# Patient Record
(demographics unavailable — no encounter records)

---

## 2024-10-17 NOTE — HISTORY OF PRESENT ILLNESS
[FreeTextEntry1] : HTN [de-identified] : vaccine- rec  COVID , flu vac.   got both shingrix vac- 9/2023, 12/2023 exercise- walking 20 min daily reviewed 4/24/24 labs-nl CBC, vit D , ESR reviewed 6/4/24 US breast, mammo-nl daytime sleepiness/ fatigue- rec Sleep study-has disrupted sleep due to handicap daughter. hypothyroid- compliant w meds.   c/o 1 yr of prominence R clavicle- reviewed 10/3/23 Xray clavicle- AC jt arthrosis.  reviewed 10/23/23 Ortho notes- likely benign ant subluxation of sternoclavicular jt b/l  fatigue-chronic- 1 yr.  had stress and ECHO last yr.  states has to take daytime naps.  doesn't know if she snores.  Sleeps alone.  sleeps 6-7 hrs HTN-      compliant with med.     - reviewed 8/27/24 ECHO- mild - mod AR, LA - mild dilated.  EF 55-60%, nl aorta IGT- diet reviewed.  states she has improved her diet chol- eating cheese daily knee pain-better.  also using Marijuana cream , meloxicam- 1 / wk,  ultram- tx by rheum, Neuro.  pt did MRI.  reviewed 8/4/22 Ortho -lateral menciscus tear- GERD- On pantop 40 mg- pt didn't tolerate lower dose.     was able to reviewed EGD 4/25/22 -- nl-  symptoms better w PPI.   denies alcohol.   1 cup coffee/ day.  taking meloxicam R side shoulder, upper back pain- 4-5 mo- daily-intermittent. worse on carrying objt or lifting the R shoulder.  pain is better on rest rarely takes meloxicam. states this was evaluated by Rheumatology C spine radiculopathy- reviewed 6/7/23 Ortho notes.  reviewed 2/28/24 colonoscopy - diverticula, hemorrhoid- Rpt 5 yr reviewed 3/3/24 DEXA-nl

## 2024-10-17 NOTE — PHYSICAL EXAM
[No Lymphadenopathy] : no lymphadenopathy [TextEntry] : Constitutional: no acute distress, well nourished, well developed and well-appearing. Pulmonary: no respiratory distress, lungs were clear to auscultation bilaterally, no accessory muscle use. Cardiac: normal rate, with a regular rhythm, normal S1 and S2 and no murmur heard.  Vascular: there was no peripheral edema. Abdomen: abdomen soft, non-tender, non-distended, no abdominal mass palpated and normal bowel sounds. Psychiatric: the affect was normal and insight and judgement were intact [de-identified] : oropharynx- mild erythema

## 2024-10-17 NOTE — PLAN
[FreeTextEntry1] : HTN- use Tylenol.  avoid meloxicam and NSAID.  low salt diet.  ck BP at the  pharmacy chol- low fat cheese flu vac given

## 2024-11-04 NOTE — ASSESSMENT
[FreeTextEntry1] : This is a 60-year-old female with a significant past medical history of snoring and daytime sleepiness who comes in for a sleep evaluation/establish care.  Assessment: 1. Excessive daytime sleepiness: The patient reports excessive daytime sleepiness and fatigue, which may be indicative of a sleep disorder. 2. Snoring and potential sleep apnea: The patient's heavy snoring and episodes of waking up with a sensation of blocked nasal passages suggest possible sleep apnea. 3. Thyroid condition: The patient has a history of thyroid issues, which may contribute to her symptoms.  Plan: - Refer the patient for a sleep study to monitor breathing, oxygen levels, and sleep patterns to diagnose potential sleep apnea. - Provide instructions for the sleep study, including equipment pickup at [address] and subsequent return after one night of use. - Schedule a follow-up appointment one or two weeks after the sleep study to review results and discuss further management.  Follow up: The patient will follow up in one or two weeks after the sleep study to review results and determine the next steps in management.

## 2024-11-04 NOTE — PHYSICAL EXAM
[No Acute Distress] : no acute distress [No Deformities] : no deformities [Low Lying Soft Palate] : low lying soft palate [III] : Mallampati Class: III [1+] : Right Tonsil: 1+ [Normal Appearance] : normal appearance [No Neck Mass] : no neck mass [Normal Rate/Rhythm] : normal rate/rhythm [Normal S1, S2] : normal s1, s2 [No Murmurs] : no murmurs [No Resp Distress] : no resp distress [Clear to Auscultation Bilaterally] : clear to auscultation bilaterally [No Clubbing] : no clubbing [No Cyanosis] : no cyanosis [No Edema] : no edema [No Focal Deficits] : no focal deficits [Oriented x3] : oriented x3 [Normal Affect] : normal affect

## 2024-11-04 NOTE — HISTORY OF PRESENT ILLNESS
[Awakes Unrefreshed] : awakes unrefreshed [Awakes with Dry Mouth] : awakes with dry mouth [Daytime Somnolence] : daytime somnolence [Nonrestorative Sleep] : nonrestorative sleep [Snoring] : snoring [Witnessed Apneas] : witnessed apneas [Hypersomnolence] : hypersomnolence [Never] : never [TextBox_4] : This is a 60-year-old female with a significant past medical history of snoring and daytime sleepiness who comes in for a sleep evaluation/establish care.  The patient presents with persistent excessive daytime sleepiness, reporting that if she sits down and gets bored, she falls asleep, which occurs almost daily. This has been ongoing for over a year. She also experiences significant fatigue, preferring to sleep rather than eat after work, often taking naps lasting two to three hours. The patient has a history of thyroid issues. She typically goes to bed around 8-9 PM and wakes up at 4 AM, rarely waking up during the night, sometimes not even hearing the alarm. However, she does take naps during the day, usually twice, lasting two to three hours each. The patient snores heavily, as reported by family members, and occasionally wakes up due to a sensation of blocked nasal passages, suspecting sinusitis but has not been diagnosed with it. She uses nasal strips or takes Clarity to alleviate the nasal blockage, which seems to help her sleep better. The patient denies waking up with headaches but does wake up with a dry mouth. She has not fallen asleep while working, but she does feel sleepy while driving, especially in traffic. No leg swelling is reported. [Awakes with Headache] : does not awaken with headache [Frequent Nocturnal Awakening] : denies frequent nocturnal awakening [Recent  Weight Gain] : no recent weight gain [Unintentional Sleep while Active] : no unintentional sleep while active [DIS] : does not have difficulty initiating sleep [DMS] : does not have difficulty maintaining sleep [Cataplexy] : no cataplexy [Sleep Paralysis] : no sleep paralysis [Hypnagogic Hallucinations] : no hypnagogic hallucinations [Hypnopompic Hallucinations] : no hypnopompic hallucinations [Lower Extremity Discomfort] : does not have lower extremity discomfort [Irresistible urge to move legs] : does not have irresistible urge to move legs [LE discomfort relieved by movement] : denies lower extremity discomfort relieved by movement [Late day/ Evening symptoms] : does not have late day/ evening symptoms [Sleep Disturbances due to LE symptoms] : denies sleep disturbances due to lower extremity symptoms [TextBox_77] : 8-9pm [TextBox_79] : 4am [TextBox_83] : 0-2 [TextBox_89] : 0 [TextBox_93] : 2-3 hour naps [ESS] : 15

## 2024-12-02 NOTE — PLAN
[FreeTextEntry1] : pain- rec ice, back brace, diclofenac cream.  if worse, pt will f/u earlier HTN- avoid NSAIDs

## 2024-12-02 NOTE — PHYSICAL EXAM
[TextEntry] : Constitutional: no acute distress, well nourished, well developed and well-appearing. Pulmonary: no respiratory distress, lungs were clear to auscultation bilaterally, no accessory muscle use. Cardiac: normal rate, with a regular rhythm, normal S1 and S2 and no murmur heard.  Vascular: there was no peripheral edema. Abdomen: abdomen soft, non-tender, non-distended, no abdominal mass palpated and normal bowel sounds. Psychiatric: the affect was normal and insight and judgement were intact [de-identified] : limited ROM due to pain. R lower rib tenderness.  lower L spine tenderness.  [de-identified] : good ROM of R hip- mild discomfort

## 2024-12-02 NOTE — HISTORY OF PRESENT ILLNESS
[FreeTextEntry8] : CC: fall pt fell yest when going down the stairs.  Missed 1 step and landed on her R back.    no trauma to the neck. no LOC.    today has pain from R mid back to R posterior thigh. .  able to walk.   no weakness of Lower ext yest took tramadol.  [de-identified] : daytime sleepiness/ fatigue- rec Sleep study-has disrupted sleep due to handicap daughter.- reviewed 11/4/24 Pulm notes- rec Sleep Study hypothyroid- compliant w meds.   c/o 1 yr of prominence R clavicle- reviewed 10/3/23 Xray clavicle- AC jt arthrosis.  reviewed 10/23/23 Ortho notes- likely benign ant subluxation of sternoclavicular jt b/l  fatigue-chronic- 1 yr.  had stress and ECHO last yr.  states has to take daytime naps.  doesn't know if she snores.  Sleeps alone.  sleeps 6-7 hrs HTN-      compliant with med.     - reviewed 8/27/24 ECHO- mild - mod AR, LA - mild dilated.  EF 55-60%, nl aorta IGT- diet reviewed.  states she has improved her diet chol- eating cheese daily knee pain-better.  also using Marijuana cream , meloxicam- 1 / wk,  ultram- tx by rheum, Neuro.  pt did MRI.  reviewed 8/4/22 Ortho -lateral menciscus tear- GERD- On pantop 40 mg- pt didn't tolerate lower dose.     was able to reviewed EGD 4/25/22 -- nl-  symptoms better w PPI.   denies alcohol.   1 cup coffee/ day.  taking meloxicam R side shoulder, upper back pain- 4-5 mo- daily-intermittent. worse on carrying objt or lifting the R shoulder.  pain is better on rest rarely takes meloxicam. states this was evaluated by Rheumatology C spine radiculopathy- reviewed 6/7/23 Ortho notes.  reviewed 11/29/24 labs , A1c 5.7

## 2024-12-09 NOTE — PHYSICAL EXAM
[TextEntry] : Constitutional: no acute distress, well nourished, well developed and well-appearing. Pulmonary: no respiratory distress, lungs were clear to auscultation bilaterally, no accessory muscle use. Cardiac: normal rate, with a regular rhythm, normal S1 and S2 and no murmur heard.  Vascular: there was no peripheral edema. Abdomen: abdomen soft, non-tender, non-distended, no abdominal mass palpated and normal bowel sounds. Psychiatric: the affect was normal and insight and judgement were intact [de-identified] : limited ROM due to pain.

## 2024-12-09 NOTE — HISTORY OF PRESENT ILLNESS
[FreeTextEntry1] : back pain [de-identified] :  has pain from R mid back to R posterior thigh pt had fallen down the steps. .  able to walk.   no weakness of Lower ext after fall. f/u from 12/2/24.   no hematuria   pain is 25 % better.  xray is not read. ultram and meloxicam help w the pain.  pain better w back brace.  daytime sleepiness/ fatigue- rec Sleep study-has disrupted sleep due to handicap daughter.- reviewed 11/4/24 Pulm notes- rec Sleep Study. hypothyroid- compliant w meds.   c/o 1 yr of prominence R clavicle- reviewed 10/3/23 Xray clavicle- AC jt arthrosis.  reviewed 10/23/23 Ortho notes- likely benign ant subluxation of sternoclavicular jt b/l  fatigue-chronic- 1 yr.  had stress and ECHO last yr.  states has to take daytime naps.  doesn't know if she snores.  Sleeps alone.  sleeps 6-7 hrs HTN-      compliant with med.     - reviewed 8/27/24 ECHO- mild - mod AR, LA - mild dilated.  EF 55-60%, nl aorta IGT- diet reviewed.  states she has improved her diet chol- eating cheese daily knee pain-better.   meloxicam- 1 / wk,  ultram- tx by rheum, Neuro.  pt did MRI.  reviewed 8/4/22 Ortho -lateral menciscus tear- GERD- On pantop 40 mg- pt didn't tolerate lower dose.     was able to reviewed EGD 4/25/22 -- nl-  symptoms better w PPI.   denies alcohol.   1 cup coffee/ day.  taking meloxicam R side shoulder, upper back pain- 4-5 mo- daily-intermittent. worse on carrying objt or lifting the R shoulder.  pain is better on rest C spine radiculopathy- reviewed 6/7/23 Ortho notes.  reviewed 11/29/24 labs , A1c 5.7

## 2024-12-13 NOTE — DISCUSSION/SUMMARY
[Medication Risks Reviewed] : Medication risks reviewed [de-identified] : She will rest and use moist heat.  I have advised against the opiates with the tramadol and she will stop the meloxicam.  I have increased the ibuprofen to 600 mg 3 times a day as a nonsteroidal anti-inflammatory.  She will call if there are problems with the medication or worsening of her symptoms and I will see her for follow-up in 3 weeks.

## 2024-12-13 NOTE — HISTORY OF PRESENT ILLNESS
[de-identified] : This 60-year-old woman had a fall on stairs at home on December 1.  She is having right sided mid to lower back pain graded as a 7 with radiation to the right buttock and lower extremity graded as a 9 or 10.  While she denies a prior history of back problems she has had 2 MRIs of her lumbar spine in the past.  She is seen in the office today along with an .  She is not having left leg pain.  The pain is slightly worse coughing and sneezing but no worse forcing to move her bowels.  She has not had night pain.  The pain is slightly worse sitting driving and standing.  Her primary care physician ordered x-rays that suggested the possibility of a nondisplaced medial right 10th rib fracture that I am not convinced when I look at the x-rays and x-rays of the lumbar spine that showed degenerative changes.  She has been using ice and heat as well as tramadol, meloxicam and ibuprofen 400 mg once a day.  She is on medication for hypertension and takes pantoprazole that controls her reflux symptoms as well as medicine for hypothyroidism. [Pain Location] : pain [9] : a minimum pain level of 9/10 [10] : a maximum pain level of 10/10

## 2024-12-13 NOTE — PHYSICAL EXAM
[de-identified] : She seems fully alert and oriented with a normal mood and affect.  She is in no acute distress as I take the history.  There are no cutaneous abnormalities or palpable bony defects of the spine.  She denies having any ecchymosis in her back or buttock as a result of the fall.  There is no paravertebral muscle spasm or trochanteric tenderness.  There is significant right sided sciatic notch tenderness but none on the left.  Forward flexion of the spine is to 60 degrees with some discomfort in the lower back.  Her lower extremity neurological examination revealed 1-2+ symmetrical reflexes.  Motor power is normal in manual testing all lower extremity groups and sensation is normal to light touch in all dermatomes.  Straight leg raising is negative to 90 degrees in the sitting position bilaterally.  Vascular examination shows no evidence of varicosities and there is no lymphedema.  There is discomfort with rotation of both hips slightly more so on the right than on the left. [de-identified] : I reviewed the rib films and I am not convinced there is a rib fracture but he and if there is there is no treatment indicated and she has minimal symptoms in that area.  She has degenerative changes in the lumbar spine appropriate for her age.  Prior x-rays revealed mild degenerative changes in both hips worse on the right than on the left.  The current x-rays show no evidence of any fracture or destructive change.

## 2025-01-21 NOTE — ASSESSMENT
[FreeTextEntry1] : This is a 60-year-old female with a significant past medical history of mild LASHELL who is here for a follow up.  Assessment: 1. Sleep Apnea: The patient has sleep apnea with an overall apnea-hypopnea index (AHI) of 10.7 events per hour, which increases to 17.7 events per hour when supine, but decreases to only 1 event per hour when on the right side. Oxygen levels drop during apneic episodes, particularly when supine. The current AHI level is not associated with significant nocturnal side effects or increased cardiovascular or cerebrovascular risks.  Plan: - Advise the patient to avoid sleeping supine by using positional therapy, such as placing a pillow or taped ball behind the back. - Consider a referral to a dental specialist for a custom oral appliance that advances the tongue and opens the airway, if the patient is amenable. - Conduct another sleep study at home using a CPAP machine with a mask to determine tolerance and effectiveness, especially given the higher AHI when supine. - Discuss the use of nasal strips, which are not recommended as they do not address the root cause of sleep apnea related to tongue and throat muscle relaxation.  Follow up: The patient will follow up in one to two weeks after the home sleep study with CPAP to assess tolerance and effectiveness, and to discuss further management options. Additionally, the patient will be provided with two appointments: one to  the CPAP device and masks, and another to follow up with the provider.

## 2025-01-21 NOTE — HISTORY OF PRESENT ILLNESS
[Never] : never [Awakes Unrefreshed] : awakes unrefreshed [Awakes with Dry Mouth] : awakes with dry mouth [Daytime Somnolence] : daytime somnolence [Nonrestorative Sleep] : nonrestorative sleep [Snoring] : snoring [Witnessed Apneas] : witnessed apneas [Hypersomnolence] : hypersomnolence [Obstructive Sleep Apnea] : obstructive sleep apnea [Home] : home [TextBox_4] : This is a 60-year-old female with a significant past medical history of mild LASHELL who is here for a follow up.  The patient reports experiencing an abnormal sleep study night due to the novelty of the procedure, unsure if it was performed correctly. The patient slept for a total of 396 minutes, with 227 minutes spent supine and 169 minutes on the right side. The patient mentions feeling warmth in the hand during sleep, which prompts changes in sleeping position to either supine or on the other side. The patient also notes using nasal strips occasionally, which seem to aid in breathing when used.  Of note: The patient presents with persistent excessive daytime sleepiness, reporting that if she sits down and gets bored, she falls asleep, which occurs almost daily. This has been ongoing for over a year. She also experiences significant fatigue, preferring to sleep rather than eat after work, often taking naps lasting two to three hours. The patient has a history of thyroid issues. She typically goes to bed around 8-9 PM and wakes up at 4 AM, rarely waking up during the night, sometimes not even hearing the alarm. However, she does take naps during the day, usually twice, lasting two to three hours each. The patient snores heavily, as reported by family members, and occasionally wakes up due to a sensation of blocked nasal passages, suspecting sinusitis but has not been diagnosed with it. She uses nasal strips or takes Clarity to alleviate the nasal blockage, which seems to help her sleep better. The patient denies waking up with headaches but does wake up with a dry mouth. She has not fallen asleep while working, but she does feel sleepy while driving, especially in traffic. No leg swelling is reported. [Awakes with Headache] : does not awaken with headache [Frequent Nocturnal Awakening] : denies frequent nocturnal awakening [Recent  Weight Gain] : no recent weight gain [Unintentional Sleep while Active] : no unintentional sleep while active [DIS] : does not have difficulty initiating sleep [DMS] : does not have difficulty maintaining sleep [Cataplexy] : no cataplexy [Sleep Paralysis] : no sleep paralysis [Hypnagogic Hallucinations] : no hypnagogic hallucinations [Hypnopompic Hallucinations] : no hypnopompic hallucinations [Lower Extremity Discomfort] : does not have lower extremity discomfort [Irresistible urge to move legs] : does not have irresistible urge to move legs [LE discomfort relieved by movement] : denies lower extremity discomfort relieved by movement [Late day/ Evening symptoms] : does not have late day/ evening symptoms [Sleep Disturbances due to LE symptoms] : denies sleep disturbances due to lower extremity symptoms [TextBox_77] : 8-9pm [TextBox_79] : 4am [TextBox_83] : 0-2 [TextBox_89] : 0 [TextBox_93] : 2-3 hour naps [TextBox_100] : 01/2025 [TextBox_108] : 10.7 [TextBox_112] : 2.4 [TextBox_116] : 82 [ESS] : 15

## 2025-03-26 NOTE — HISTORY OF PRESENT ILLNESS
[FreeTextEntry1] : chol [de-identified] : reviewed 3/25/25 labs- , A1c 5.8 R side sciatica- reviewed 12/13/24 Ortho notes- tx w ibuprofen.  no longer taking ibuprofen.  pain resolved 1 mo ago LASHELL.- reviewed 1/21/25 Pulm notes- reviewed 1/8/25 Sleep Study- mild LASHELL pt c/o chronic nasal congestion. hypothyroid- compliant w meds.   c/o 1 yr of prominence R clavicle- reviewed 10/3/23 Xray clavicle- AC jt arthrosis.  reviewed 10/23/23 Ortho notes- likely benign ant subluxation of sternoclavicular jt b/l  fatigue-chronic- 1 yr.  had stress and ECHO last yr.  states has to take daytime naps.  doesn't know if she snores.  Sleeps alone.  sleeps 6-7 hrs HTN-      compliant with med.     - reviewed 8/27/24 ECHO- mild - mod AR, LA - mild dilated.  EF 55-60%, nl aorta IGT- diet reviewed.  states she has improved her diet chol- eating cheese daily knee pain-better.   meloxicam- 1 / wk,  ultram- tx by rheum, Neuro.  pt did MRI.  reviewed 8/4/22 Ortho -lateral menciscus tear- GERD- On pantop 40 mg- pt didn't tolerate lower dose.     was able to reviewed EGD 4/25/22 -- nl-  symptoms better w PPI.   denies alcohol.   1 cup coffee/ day.  taking meloxicam- 1/wk R side shoulder, upper back pain- 4-5 mo- daily-intermittent. worse on carrying objt or lifting the R shoulder.  pain is better on rest C spine radiculopathy- reviewed 6/7/23 Ortho notes.  reviewed 11/29/24 labs , A1c 5.7

## 2025-03-26 NOTE — PHYSICAL EXAM
[TextEntry] : Constitutional: no acute distress, well nourished, well developed and well-appearing. Pulmonary: no respiratory distress, lungs were clear to auscultation bilaterally, no accessory muscle use. Cardiac: normal rate, with a regular rhythm, normal S1 and S2 and no murmur heard.  Vascular: there was no peripheral edema. Abdomen: abdomen soft, non-tender, non-distended, no abdominal mass palpated and normal bowel sounds. Psychiatric: the affect was normal and insight and judgement were intact [de-identified] : limited ROM due to pain.

## 2025-03-26 NOTE — PLAN
[FreeTextEntry1] : GERD- limit NSAID,  wgt loss life style chg lipid-dietary chg discussed.  pt refused Nutrition consult.   5 minutes were spent administering an evidence based ASCVD Risk Assessment tool showing patient's risk being calculated as  3.83 % and discussing the results with patient including lifestyle modifications to be taken

## 2025-07-15 NOTE — PLAN
[FreeTextEntry1] : chol- 5 minutes were spent administering an evidence based ASCVD Risk Assessment tool showing patient's risk being calculated as  4.9 % and discussing the results with patient including lifestyle modifications  LASHELL- rec regular use of Mandibular device EKG- SInus al 55- no chg c/o 1/19/24 HA- pt will f/u w Ophth- if still not resolved- then f/u w Neuro  nasal congestion- try Azelastine.  states flonase didn't work for her

## 2025-07-15 NOTE — HISTORY OF PRESENT ILLNESS
[FreeTextEntry1] : CPE [de-identified] : vaccine- pt got 2 Shingrix vac,  rec prevnar 21 or 20  vac diet- reviewed healthy diet w pt.  recr decr red meat and processed meat exercise- no .  but active life style reviewed 7/14//25 labs-  c/o HA- for many yrs but over  2 mo -has nausea w HA.  pt thinks it is due to prob w her vision LASHELL.- reviewed 1/21/25 Pulm notes- reviewed 1/8/25 Sleep Study- mild LASHELL c/o nasal congestion - 1yr c/o several yr2 mo- HA diffuse- w nausea- 2 mo- resolves w skeep pt c/o chronic nasal congestion. hypothyroid- compliant w meds.   c/o 1 yr of prominence R clavicle- reviewed 10/3/23 Xray clavicle- AC jt arthrosis.  reviewed 10/23/23 Ortho notes- likely benign ant subluxation of sternoclavicular jt b/l  fatigue-chronic- 1 yr.  had stress and ECHO last yr.  states has to take daytime naps.  pt has Sleep Apnea HTN-      compliant with med.     - reviewed 8/27/24 ECHO- mild - mod AR, LA - mild dilated.  EF 55-60%, nl aorta IGT- diet reviewed.  states she has improved her diet chol- eating cheese - 2/ wk  and red meat 2/ wk knee pain-better.   meloxicam- 1 / wk,  ultram- tx by rheum, Neuro.  pt did MRI.  reviewed 8/4/22 Ortho -lateral menciscus tear- GERD- On pantop 40 mg- pt didn't tolerate lower dose.     was able to reviewed EGD 4/25/22 -- nl-  symptoms better w PPI.   denies alcohol.   1 cup coffee/ day.  taking meloxicam- 1/wk R side shoulder, upper back pain- 4-5 mo- daily-intermittent. worse on carrying objt or lifting the R shoulder.  pain is better on rest C spine radiculopathy- reviewed 6/7/23 Ortho notes.  1/8/25 Sleep study- mild Sleep Apnea- has manidibular device- pt not using it regularly

## 2025-07-15 NOTE — PHYSICAL EXAM
[Well Nourished] : well nourished [Well Developed] : well developed [Well-Appearing] : well-appearing [No Lymphadenopathy] : no lymphadenopathy [Supple] : supple [Thyroid Normal, No Nodules] : the thyroid was normal and there were no nodules present [No Respiratory Distress] : no respiratory distress  [No Accessory Muscle Use] : no accessory muscle use [Clear to Auscultation] : lungs were clear to auscultation bilaterally [Normal Rate] : normal rate  [Regular Rhythm] : with a regular rhythm [Normal S1, S2] : normal S1 and S2 [No Murmur] : no murmur heard [No Carotid Bruits] : no carotid bruits [No Edema] : there was no peripheral edema [Normal Appearance] : normal in appearance [No Axillary Lymphadenopathy] : no axillary lymphadenopathy [Soft] : abdomen soft [Non Tender] : non-tender [Non-distended] : non-distended [No Masses] : no abdominal mass palpated [Normal Bowel Sounds] : normal bowel sounds [Normal Supraclavicular Nodes] : no supraclavicular lymphadenopathy [Normal Axillary Nodes] : no axillary lymphadenopathy [Normal Posterior Cervical Nodes] : no posterior cervical lymphadenopathy [Normal Anterior Cervical Nodes] : no anterior cervical lymphadenopathy [Normal Inguinal Nodes] : no inguinal lymphadenopathy [Grossly Normal Strength/Tone] : grossly normal strength/tone [No Focal Deficits] : no focal deficits [Normal Gait] : normal gait [Normal Affect] : the affect was normal [Normal Insight/Judgement] : insight and judgment were intact [de-identified] : L soft tissue mass- L  upper arm/.  R clavicale more prominent

## 2025-07-15 NOTE — HEALTH RISK ASSESSMENT
[Never (0 pts)] : Never (0 points) [No] : In the past 12 months have you used drugs other than those required for medical reasons? No [0] : 1) Little interest or pleasure doing things: Not at all (0) [1] : 2) Feeling down, depressed, or hopeless for several days (1) [PHQ-2 Negative - No further assessment needed] : PHQ-2 Negative - No further assessment needed [Patient reported mammogram was normal] : Patient reported mammogram was normal [Patient reported PAP Smear was abnormal] : Patient reported PAP Smear was abnormal [HIV test declined] : HIV test declined [Hepatitis C test declined] : Hepatitis C test declined [With Patient/Caregiver] : , with patient/caregiver [Relationship: ___] : Relationship: [unfilled] [Audit-CScore] : 0 [[id number]] : 1 [Never] : Never [NO] : No [Patient reported colonoscopy was normal] : Patient reported colonoscopy was normal [MammogramDate] : 06/24 [PapSmearDate] : 11/22 [PapSmearComments] :  Atypical squam cell. 1/30/23 colposcopy- benign [ColonoscopyDate] : 02/24 [ColonoscopyComments] : diverticulosis, hemorrhoid- rpt  5 yr [de-identified] : last seen ophth 07/22- pt will make f/u appt [de-identified] : last seen dentist  3 mo ago [Designated Healthcare Proxy] : Designated healthcare proxy [Name: ___] : Health Care Proxy's Name: [unfilled]  [AdvancecareDate] : 07/25